# Patient Record
Sex: FEMALE | Race: WHITE | Employment: FULL TIME | URBAN - METROPOLITAN AREA
[De-identification: names, ages, dates, MRNs, and addresses within clinical notes are randomized per-mention and may not be internally consistent; named-entity substitution may affect disease eponyms.]

---

## 2021-03-29 ENCOUNTER — HOSPITAL ENCOUNTER (INPATIENT)
Facility: HOSPITAL | Age: 51
LOS: 2 days | Discharge: HOME | DRG: 176 | End: 2021-04-01
Attending: EMERGENCY MEDICINE | Admitting: HOSPITALIST
Payer: COMMERCIAL

## 2021-03-29 ENCOUNTER — APPOINTMENT (OUTPATIENT)
Dept: CT IMAGING | Facility: HOSPITAL | Age: 51
DRG: 176 | End: 2021-03-29
Attending: EMERGENCY MEDICINE
Payer: COMMERCIAL

## 2021-03-29 DIAGNOSIS — I26.99 ACUTE PULMONARY EMBOLISM, UNSPECIFIED PULMONARY EMBOLISM TYPE, UNSPECIFIED WHETHER ACUTE COR PULMONALE PRESENT (HCC): Primary | ICD-10-CM

## 2021-03-29 PROCEDURE — 71275 CT ANGIOGRAPHY CHEST: CPT | Performed by: EMERGENCY MEDICINE

## 2021-03-29 PROCEDURE — 99223 1ST HOSP IP/OBS HIGH 75: CPT | Performed by: INTERNAL MEDICINE

## 2021-03-29 RX ORDER — HEPARIN SODIUM AND DEXTROSE 10000; 5 [USP'U]/100ML; G/100ML
18 INJECTION INTRAVENOUS ONCE
Status: COMPLETED | OUTPATIENT
Start: 2021-03-29 | End: 2021-03-30

## 2021-03-29 RX ORDER — HEPARIN SODIUM 5000 [USP'U]/ML
80 INJECTION INTRAVENOUS; SUBCUTANEOUS ONCE
Status: COMPLETED | OUTPATIENT
Start: 2021-03-29 | End: 2021-03-29

## 2021-03-30 ENCOUNTER — APPOINTMENT (OUTPATIENT)
Dept: ULTRASOUND IMAGING | Facility: HOSPITAL | Age: 51
DRG: 176 | End: 2021-03-30
Attending: INTERNAL MEDICINE
Payer: COMMERCIAL

## 2021-03-30 ENCOUNTER — APPOINTMENT (OUTPATIENT)
Dept: CV DIAGNOSTICS | Facility: HOSPITAL | Age: 51
DRG: 176 | End: 2021-03-30
Attending: INTERNAL MEDICINE
Payer: COMMERCIAL

## 2021-03-30 PROBLEM — I26.99 ACUTE PULMONARY EMBOLISM, UNSPECIFIED PULMONARY EMBOLISM TYPE, UNSPECIFIED WHETHER ACUTE COR PULMONALE PRESENT (HCC): Status: ACTIVE | Noted: 2021-03-30

## 2021-03-30 PROCEDURE — 99255 IP/OBS CONSLTJ NEW/EST HI 80: CPT | Performed by: INTERNAL MEDICINE

## 2021-03-30 PROCEDURE — 93306 TTE W/DOPPLER COMPLETE: CPT | Performed by: INTERNAL MEDICINE

## 2021-03-30 PROCEDURE — 99232 SBSQ HOSP IP/OBS MODERATE 35: CPT | Performed by: HOSPITALIST

## 2021-03-30 PROCEDURE — 93970 EXTREMITY STUDY: CPT | Performed by: INTERNAL MEDICINE

## 2021-03-30 RX ORDER — ALPRAZOLAM 0.25 MG/1
0.25 TABLET ORAL 2 TIMES DAILY PRN
Status: DISCONTINUED | OUTPATIENT
Start: 2021-03-30 | End: 2021-04-01

## 2021-03-30 RX ORDER — LABETALOL HYDROCHLORIDE 5 MG/ML
10 INJECTION, SOLUTION INTRAVENOUS EVERY 6 HOURS PRN
Status: DISCONTINUED | OUTPATIENT
Start: 2021-03-30 | End: 2021-04-01

## 2021-03-30 RX ORDER — POLYETHYLENE GLYCOL 3350 17 G/17G
17 POWDER, FOR SOLUTION ORAL DAILY PRN
Status: DISCONTINUED | OUTPATIENT
Start: 2021-03-30 | End: 2021-04-01

## 2021-03-30 RX ORDER — HYDROCODONE BITARTRATE AND ACETAMINOPHEN 10; 325 MG/1; MG/1
1 TABLET ORAL EVERY 4 HOURS PRN
Status: DISCONTINUED | OUTPATIENT
Start: 2021-03-30 | End: 2021-04-01

## 2021-03-30 RX ORDER — OXYBUTYNIN CHLORIDE 5 MG/1
5 TABLET ORAL 2 TIMES DAILY
Status: DISCONTINUED | OUTPATIENT
Start: 2021-03-30 | End: 2021-04-01

## 2021-03-30 RX ORDER — CETIRIZINE HYDROCHLORIDE 10 MG/1
10 TABLET ORAL DAILY
Status: DISCONTINUED | OUTPATIENT
Start: 2021-03-30 | End: 2021-04-01

## 2021-03-30 RX ORDER — HYDRALAZINE HYDROCHLORIDE 20 MG/ML
10 INJECTION INTRAMUSCULAR; INTRAVENOUS EVERY 6 HOURS PRN
Status: DISCONTINUED | OUTPATIENT
Start: 2021-03-30 | End: 2021-04-01

## 2021-03-30 RX ORDER — ESCITALOPRAM OXALATE 20 MG/1
20 TABLET ORAL DAILY
Status: DISCONTINUED | OUTPATIENT
Start: 2021-03-30 | End: 2021-04-01

## 2021-03-30 RX ORDER — ONDANSETRON 2 MG/ML
4 INJECTION INTRAMUSCULAR; INTRAVENOUS EVERY 6 HOURS PRN
Status: DISCONTINUED | OUTPATIENT
Start: 2021-03-30 | End: 2021-04-01

## 2021-03-30 RX ORDER — ACETAMINOPHEN 325 MG/1
650 TABLET ORAL EVERY 6 HOURS PRN
Status: DISCONTINUED | OUTPATIENT
Start: 2021-03-30 | End: 2021-04-01

## 2021-03-30 RX ORDER — HEPARIN SODIUM AND DEXTROSE 10000; 5 [USP'U]/100ML; G/100ML
INJECTION INTRAVENOUS CONTINUOUS
Status: DISCONTINUED | OUTPATIENT
Start: 2021-03-30 | End: 2021-04-01

## 2021-03-30 RX ORDER — BUPROPION HYDROCHLORIDE 100 MG/1
200 TABLET ORAL 2 TIMES DAILY
Status: DISCONTINUED | OUTPATIENT
Start: 2021-03-30 | End: 2021-04-01

## 2021-03-30 RX ORDER — TOPIRAMATE 25 MG/1
100 TABLET ORAL DAILY
Status: DISCONTINUED | OUTPATIENT
Start: 2021-03-30 | End: 2021-04-01

## 2021-03-30 RX ORDER — DOCUSATE SODIUM 100 MG/1
100 CAPSULE, LIQUID FILLED ORAL 2 TIMES DAILY PRN
Status: DISCONTINUED | OUTPATIENT
Start: 2021-03-30 | End: 2021-04-01

## 2021-03-30 RX ORDER — POTASSIUM CHLORIDE 20 MEQ/1
40 TABLET, EXTENDED RELEASE ORAL EVERY 4 HOURS
Status: COMPLETED | OUTPATIENT
Start: 2021-03-30 | End: 2021-03-30

## 2021-03-30 RX ORDER — MELATONIN
3 NIGHTLY PRN
Status: DISCONTINUED | OUTPATIENT
Start: 2021-03-30 | End: 2021-04-01

## 2021-03-30 NOTE — ED PROVIDER NOTES
Patient Seen in: [facility] Emergency Department      History   Patient presents with:  Difficulty Breathing    Stated Complaint: sob with walking     HPI/Subjective:   HPI    Nelson Garcia is a pleasant 78-year-old female, with complaints of shortness of br the following components:       Result Value    BUN 23 (*)     Creatinine 1.08 (*)     BUN/CREA Ratio 21.3 (*)     GFR, Non- 59 (*)     ALT 57 (*)     All other components within normal limits   D-DIMER - Abnormal; Notable for the following diagnosis)    Disposition:  Admit  3/29/2021 10:52 pm    Follow-up:  No follow-up provider specified.         Medications Prescribed:  Current Discharge Medication List                          Hospital Problems     Present on Admission  Date Reviewed: 2/10

## 2021-03-30 NOTE — ED NOTES
Spoke w/ rn jan, room is being cleaned right now, wf room to be xenex cleaned , housekeeping notified

## 2021-03-30 NOTE — ED INITIAL ASSESSMENT (HPI)
Pt reports increasing RAYMUNDO since Wednesday. Thursday received first covid vaccine. RAYMUNDO continues to worsen. Denies leg swelling or fevers.

## 2021-03-30 NOTE — ED NOTES
Pt anxious about dogs at home and how long it is taking for the room to be cleaned, pt reassured that pt will be transferred to room when cleaned, pt denies cp, will continue to monitor

## 2021-03-30 NOTE — PLAN OF CARE
Patient arrived to unit approximately 0100. Admission navigator and home medications verified with patient. Norco administered for bilateral leg pain. US of lower extremity completed. Oxygen saturation remains above 92% on room air.  Short of breath with ac

## 2021-03-30 NOTE — PAYOR COMM NOTE
--------------  ADMISSION REVIEW     Payor: [address]  Subscriber #:  [id number]  Authorization Number: [id number]    Admit date: 3/30/21  Admit time: 12:53 AM       Admitting Physician: Andres Guillory MD  Attending Physician:  Omer Bunch 22   Temp 98.6 °F (37 °C)   Temp src Temporal   SpO2 93 %   O2 Device None (Room air)       Current:BP (!) 159/101   Pulse 69   Temp 98.6 °F (37 °C) (Temporal)   Resp 23   Ht 165.1 cm (5' 5\")   Wt 136.1 kg   SpO2 94%   BMI 49.92 kg/m²         Physical Exa due to her multiple pulmonary emboli. This involved direct patient intervention, complex decision making, and/or extensive discussions with the patient, family, and clinical staff.           MDM      Patient was ordered heparin per protocol was discussed w She denies any fever, chills, cough or congestion. She has no associated nausea, vomiting, diaphoresis, abdominal pain, diarrhea or dysuria. She has mild right calf tenderness and some edema in lower extremities bilaterally.   In the ER, HR normal and SBP tablet, Rfl: 0  Diclofenac Sodium (VOLTAREN) 1 % Transdermal Gel, Apply 4 g topically 4 (four) times daily. , Disp: 100 g, Rfl: 2  Norethindrone 0.35 MG Oral Tab, Take 0.35 mg by mouth once daily. , Disp: , Rfl:   Citalopram Hydrobromide 40 MG Oral Tab, Take Lab 03/29/21 2059   GLU 90   BUN 23*   CREATSERUM 1.08*   GFRAA 68   GFRNAA 59*   CA 9.8   ALB 3.9      K 4.5      CO2 23.0   ALKPHO 99   AST 30   ALT 57*   BILT 1.3   TP 7.5     Estimated Creatinine Clearance: 54.8 mL/min (A) (based on SCr Intravenous Leona Hoskins RN      HYDROcodone-acetaminophen [facility])  MG per tab 1 tablet     Date Action Dose Route User    3/30/2021 0228 Given 1 tablet Oral Leona Hoskins RN      iohexol (OMNIPAQUE) 350 MG/ML injection 100 mL     Date Action Dose Route

## 2021-03-30 NOTE — CONSULTS
[facility] Cardiology Consult Note    Philippe Leikeila Patient Status:  Inpatient    1970 MRN [id number]   [facility] 7NE-A [facility] Favian Wheeler MD     HPI:  53-year-old female with a past m Cardiology    Addendum:    On review the echocardiogram the patient appears to have some evidence of RV dilation with RV to LV ratio greater than 1.0. In light of this it could be reasonable to consider EKOS catheter placement.   I discussed this with the murmur, pericardial rub, S3.  Lungs: Clear without wheezes, rales, rhonchi or dullness. Normal excursions and effort. Abdomen: Soft, non-tender. BS-present. Extremities: Without clubbing, cyanosis or edema. Peripheral pulses are 2+.   Neurologic: Non-fo

## 2021-03-30 NOTE — PLAN OF CARE
Received patient A/Ox4, RA, NSR on tele at 0700  2L NC placed to keep SpO2 >90%  Echo completed  Heparin gtt per PE order set, PTT in am  Norco for pain to knees  Ambulated to bathroom, SOB with exertion   Potassium replaced per electrolyte protocol    Pro

## 2021-03-30 NOTE — PROGRESS NOTES
[facility]  Progress Note     Mounika Henderson Patient Status:  Inpatient    1970 MRN [id number]   [facility] 7NE-A Attending Jeri [facility] Day # 0 PCP Hernandez Juarez MD     Chief Complaint: SOB    S: Patient in the last 168 hours. COVID-19 Lab Results    COVID-19  Lab Results   Component Value Date    COVID19 Not Detected 03/29/2021       Pro-Calcitonin  No results for input(s): PCT in the last 168 hours.     Cardiac  Recent Labs   Lab 03/29/21 2059 discussed with pt at bedside    Rodolfo Gibes, DO

## 2021-03-30 NOTE — H&P
[facility]  History and Physical     Thao Bloomingrose Patient Status:  Emergency    1970 MRN [id number]   Location [facility] Attending Daniela August MD   Hosp Day # 0 PCP Karthik Luna MD     Chief Complaint: congestion    Medications:  No current facility-administered medications on file prior to encounter.   HYDROcodone-acetaminophen (NORCO)  MG Oral Tab, 1 tab po q4 hr prn severe knee pain-30 days, Disp: 90 tablet, Rfl: 0  Diclofenac Sodium 50 MG Oral T No JVD. No carotid bruits. Respiratory: Clear to auscultation bilaterally. No wheezes. No rhonchi. Cardiovascular: S1, S2. Regular rate and rhythm. No murmurs, rubs or gallops. Equal pulses. Chest and Back: No tenderness or deformity.   Abdomen: Soft, n Prophylaxis: Heparin gtt  · CODE status: Full  · [place]: No  · If COVID testing is negative, may discontinue isolation: Yes     Plan of care discussed with patient.     Isra Quintana DO  3/29/2021

## 2021-03-31 PROCEDURE — 99232 SBSQ HOSP IP/OBS MODERATE 35: CPT | Performed by: INTERNAL MEDICINE

## 2021-03-31 PROCEDURE — 99232 SBSQ HOSP IP/OBS MODERATE 35: CPT | Performed by: HOSPITALIST

## 2021-03-31 NOTE — PAYOR COMM NOTE
--------------  CONTINUED STAY REVIEW    Payor: [facility]  Subscriber #:  [id number]  Authorization Number: [id number]    Admit date: 3/30/21  Admit time: 12:53 AM    Admitting Physician: Jt Hartley MD  Attending Physician:  Barb Valdes primary service with regards to plans for chronic anticoagulation. May need to consider hematology evaluation.   Cardiology will follow peripherally, please call with any questions or concerns.     Terry Oliver DO         Electronically signed by Chaparrita Johns 3/31/2021 0940 Given 100 mg Oral Johann Null, JUAN        ASSESSMENT / PLAN:      1. Acute bilateral PE with LLE DVT and hypoxia. On OCP since the age of 15yo for heavy menses. No children  1. Cardiology consulted  2. Heparin drip  3.  Stopped OCP an

## 2021-03-31 NOTE — PLAN OF CARE
Assumed pt care at 0730  VSS, patient weened to 0.5L NC, still experiencing some SOB w/exertion  Heparin gtt infusing per protocol, next pTT in AM  PRN Norco given for knee pain  POC discussed with pt  Needs attended to      Problem: Patient/Family Goals

## 2021-03-31 NOTE — PLAN OF CARE
Assumed care 1930  Pt NSR on tele, 2L O2 NC  Heparin infusing  Needs met will continue to monitor          Problem: RESPIRATORY - ADULT  Goal: Achieves optimal ventilation and oxygenation  Description: INTERVENTIONS:  - Assess for changes in respiratory st

## 2021-03-31 NOTE — PROGRESS NOTES
[facility]  Cardiology Progress Note    Subjective:  No chest pain or shortness of breath.     Objective:  /90 (BP Location: Right arm)   Pulse 76   Temp 98 °F (36.7 °C) (Oral)   Resp 16   Ht 5' 5\" (1.651 m)   Wt 300 lb (136.1 kg)   SpO2 95%   B

## 2021-03-31 NOTE — PROGRESS NOTES
[facility]  Progress Note     Med Perez Patient Status:  Inpatient    1970 MRN [id number]   [facility] 7NE-A Attending Kendal [facility] Day # 1 PCP Eneida Wells MD     Chief Complaint: SOB    S: No overn Pro-Calcitonin  No results for input(s): PCT in the last 168 hours. Cardiac  Recent Labs   Lab 03/29/21 2059   TROP <0.045   PBNP 2,159*       Creatinine Kinase  No results for input(s): CK in the last 168 hours.     Inflammatory Markers  Recent L

## 2021-04-01 VITALS
BODY MASS INDEX: 48.82 KG/M2 | HEART RATE: 75 BPM | RESPIRATION RATE: 16 BRPM | DIASTOLIC BLOOD PRESSURE: 82 MMHG | WEIGHT: 293 LBS | SYSTOLIC BLOOD PRESSURE: 133 MMHG | HEIGHT: 65 IN | OXYGEN SATURATION: 96 % | TEMPERATURE: 98 F

## 2021-04-01 PROCEDURE — 99239 HOSP IP/OBS DSCHRG MGMT >30: CPT | Performed by: HOSPITALIST

## 2021-04-01 NOTE — PROGRESS NOTES
[facility]  Progress Note     Slava Akbar Patient Status:  Inpatient    1970 MRN [id number]   [facility] 7NE-A Attending Damian Gu[address] Day # 2 PCP Todd Cherry MD     Chief Complaint: SOB    S: No overn 03/29/2021       Pro-Calcitonin  No results for input(s): PCT in the last 168 hours. Cardiac  Recent Labs   Lab 03/29/21 2059   TROP <0.045   PBNP 2,159*       Creatinine Kinase  No results for input(s): CK in the last 168 hours.     Inflammatory Marker

## 2021-04-01 NOTE — PLAN OF CARE
Assumed care of pt at approximately 1900. Alert, oriented x4. On .5L O2 per NC, increased to 2L with sleep to maintain O2 at >90%. Denies any chest pain. Reports dyspnea with ambulation. Heparin gtt infusing per protocol.    Norco given for chronic tobin injury  Description: (Example usage: patient with low platelets)  INTERVENTIONS:  - Avoid intramuscular injections, enemas and rectal medication administration  - Ensure safe mobilization of patient  - Hold pressure on venipuncture sites to achieve adequat

## 2021-04-01 NOTE — PLAN OF CARE
NURSING DISCHARGE NOTE    Discharged Home via Wheelchair. Accompanied by Support staff  Belongings Taken by patient/family. Patient educated on dc medications, follow ups, and instructions. She verbalized understanding. All questions answered.

## 2021-04-02 NOTE — DISCHARGE SUMMARY
[facility]  DISCHARGE SUMMARY     Rachael Escobar Patient Status:  Inpatient    1970 MRN [id number]   [facility] 7NE-A Attending No att. providers found   Hosp Day # 2 PCP Juana Kapadia MD     Date of Admission: 3/29/2021  Margarito pain, diarrhea or dysuria. She has mild right calf tenderness and some edema in lower extremities bilaterally. In the ER, HR normal and SBP elevated around 160s. She was on room air. D-dimer was elevated. Troponin negative.   CTA chest showed bilateral apixaban 5 MG Tabs  Commonly known as: ELIQUIS      Take 2 tabs (10mg) by mouth twice daily for 7 days, then take 1 tab (5mg) by mouth twice daily thereafter.    Quantity: 74 tablet  Refills: 0        CONTINUE taking these medications      Instructions [address] [facility], [phone], [phone]  [facility] [address] [facility], [address]    Phone: [phone]   · apixaban 5 MG Tabs         Follow-up appointment:   No follow-up provider specified.

## 2021-09-21 ENCOUNTER — HOSPITAL ENCOUNTER (OUTPATIENT)
Dept: ULTRASOUND IMAGING | Age: 51
Discharge: HOME | End: 2021-09-21
Attending: INTERNAL MEDICINE
Payer: COMMERCIAL

## 2021-09-21 DIAGNOSIS — Z86.718 PERSONAL HISTORY OF DVT (DEEP VEIN THROMBOSIS): ICD-10-CM

## 2021-09-21 PROCEDURE — 93970 EXTREMITY STUDY: CPT | Performed by: INTERNAL MEDICINE

## 2022-12-13 ENCOUNTER — APPOINTMENT (OUTPATIENT)
Dept: GENERAL RADIOLOGY | Facility: HOSPITAL | Age: 52
End: 2022-12-13
Attending: EMERGENCY MEDICINE
Payer: COMMERCIAL

## 2022-12-13 ENCOUNTER — HOSPITAL ENCOUNTER (INPATIENT)
Facility: HOSPITAL | Age: 52
LOS: 2 days | Discharge: HOME | End: 2022-12-16
Attending: EMERGENCY MEDICINE | Admitting: HOSPITALIST
Payer: COMMERCIAL

## 2022-12-13 ENCOUNTER — APPOINTMENT (OUTPATIENT)
Dept: CT IMAGING | Facility: HOSPITAL | Age: 52
End: 2022-12-13
Attending: EMERGENCY MEDICINE
Payer: COMMERCIAL

## 2022-12-13 ENCOUNTER — HOSPITAL ENCOUNTER (INPATIENT)
Facility: HOSPITAL | Age: 52
LOS: 2 days | Discharge: HOME | DRG: 177 | End: 2022-12-16
Attending: EMERGENCY MEDICINE | Admitting: HOSPITALIST
Payer: COMMERCIAL

## 2022-12-13 ENCOUNTER — APPOINTMENT (OUTPATIENT)
Dept: GENERAL RADIOLOGY | Facility: HOSPITAL | Age: 52
DRG: 177 | End: 2022-12-13
Attending: EMERGENCY MEDICINE
Payer: COMMERCIAL

## 2022-12-13 ENCOUNTER — APPOINTMENT (OUTPATIENT)
Dept: CT IMAGING | Facility: HOSPITAL | Age: 52
DRG: 177 | End: 2022-12-13
Attending: EMERGENCY MEDICINE
Payer: COMMERCIAL

## 2022-12-13 DIAGNOSIS — U07.1 PNEUMONIA DUE TO COVID-19 VIRUS: Primary | ICD-10-CM

## 2022-12-13 DIAGNOSIS — R09.02 HYPOXIA: ICD-10-CM

## 2022-12-13 DIAGNOSIS — J12.82 PNEUMONIA DUE TO COVID-19 VIRUS: Primary | ICD-10-CM

## 2022-12-13 DIAGNOSIS — I50.9 ACUTE ON CHRONIC CONGESTIVE HEART FAILURE, UNSPECIFIED HEART FAILURE TYPE (HCC): ICD-10-CM

## 2022-12-13 LAB
ALBUMIN SERPL-MCNC: 3.2 G/DL (ref 3.4–5)
ALBUMIN/GLOB SERPL: 0.8 {RATIO} (ref 1–2)
ALP LIVER SERPL-CCNC: 83 U/L
ALT SERPL-CCNC: 19 U/L
ANION GAP SERPL CALC-SCNC: 9 MMOL/L (ref 0–18)
AST SERPL-CCNC: 25 U/L (ref 15–37)
BASOPHILS # BLD AUTO: 0.04 X10(3) UL (ref 0–0.2)
BASOPHILS NFR BLD AUTO: 0.3 %
BILIRUB SERPL-MCNC: 1.2 MG/DL (ref 0.1–2)
BUN BLD-MCNC: 29 MG/DL (ref 7–18)
CALCIUM BLD-MCNC: 9.1 MG/DL (ref 8.5–10.1)
CHLORIDE SERPL-SCNC: 109 MMOL/L (ref 98–112)
CO2 SERPL-SCNC: 22 MMOL/L (ref 21–32)
CREAT BLD-MCNC: 1.41 MG/DL
D DIMER PPP FEU-MCNC: 1 UG/ML FEU (ref ?–0.54)
EOSINOPHIL # BLD AUTO: 0.03 X10(3) UL (ref 0–0.7)
EOSINOPHIL NFR BLD AUTO: 0.2 %
ERYTHROCYTE [DISTWIDTH] IN BLOOD BY AUTOMATED COUNT: 14.6 %
FLUAV + FLUBV RNA SPEC NAA+PROBE: NEGATIVE
FLUAV + FLUBV RNA SPEC NAA+PROBE: NEGATIVE
GFR SERPLBLD BASED ON 1.73 SQ M-ARVRAT: 44 ML/MIN/1.73M2 (ref 60–?)
GLOBULIN PLAS-MCNC: 4.2 G/DL (ref 2.8–4.4)
GLUCOSE BLD-MCNC: 102 MG/DL (ref 70–99)
HCT VFR BLD AUTO: 35.4 %
HGB BLD-MCNC: 11.4 G/DL
IMM GRANULOCYTES # BLD AUTO: 0.04 X10(3) UL (ref 0–1)
IMM GRANULOCYTES NFR BLD: 0.3 %
LYMPHOCYTES # BLD AUTO: 1.89 X10(3) UL (ref 1–4)
LYMPHOCYTES NFR BLD AUTO: 12.3 %
MCH RBC QN AUTO: 28.4 PG (ref 26–34)
MCHC RBC AUTO-ENTMCNC: 32.2 G/DL (ref 31–37)
MCV RBC AUTO: 88.1 FL
MONOCYTES # BLD AUTO: 0.56 X10(3) UL (ref 0.1–1)
MONOCYTES NFR BLD AUTO: 3.6 %
NEUTROPHILS # BLD AUTO: 12.82 X10 (3) UL (ref 1.5–7.7)
NEUTROPHILS # BLD AUTO: 12.82 X10(3) UL (ref 1.5–7.7)
NEUTROPHILS NFR BLD AUTO: 83.3 %
NT-PROBNP SERPL-MCNC: 3155 PG/ML (ref ?–125)
OSMOLALITY SERPL CALC.SUM OF ELEC: 296 MOSM/KG (ref 275–295)
PLATELET # BLD AUTO: 249 10(3)UL (ref 150–450)
POTASSIUM SERPL-SCNC: 3.5 MMOL/L (ref 3.5–5.1)
PROT SERPL-MCNC: 7.4 G/DL (ref 6.4–8.2)
RBC # BLD AUTO: 4.02 X10(6)UL
RSV RNA SPEC NAA+PROBE: NEGATIVE
SARS-COV-2 RNA RESP QL NAA+PROBE: DETECTED
SODIUM SERPL-SCNC: 140 MMOL/L (ref 136–145)
TROPONIN I HIGH SENSITIVITY: 11 NG/L
WBC # BLD AUTO: 15.4 X10(3) UL (ref 4–11)

## 2022-12-13 PROCEDURE — 71045 X-RAY EXAM CHEST 1 VIEW: CPT | Performed by: EMERGENCY MEDICINE

## 2022-12-13 PROCEDURE — 71260 CT THORAX DX C+: CPT | Performed by: EMERGENCY MEDICINE

## 2022-12-13 NOTE — ED INITIAL ASSESSMENT (HPI)
Patient to ER from w/ complaints of SOB on exertion. States yesterday she felt like she was getting a cold but today experienced the RAYMUNDO. Hx of PE 2 years ago. 99%spo2 on RA    Denies CP  Easy WOB noted.

## 2022-12-14 PROBLEM — R09.02 HYPOXIA: Status: ACTIVE | Noted: 2022-12-14

## 2022-12-14 PROBLEM — U07.1 PNEUMONIA DUE TO COVID-19 VIRUS: Status: ACTIVE | Noted: 2022-12-14

## 2022-12-14 PROBLEM — I50.9 ACUTE ON CHRONIC CONGESTIVE HEART FAILURE, UNSPECIFIED HEART FAILURE TYPE (HCC): Status: ACTIVE | Noted: 2022-12-14

## 2022-12-14 PROBLEM — J12.82 PNEUMONIA DUE TO COVID-19 VIRUS: Status: ACTIVE | Noted: 2022-12-14

## 2022-12-14 LAB
ALBUMIN SERPL-MCNC: 3.3 G/DL (ref 3.4–5)
ALBUMIN/GLOB SERPL: 0.9 {RATIO} (ref 1–2)
ALP LIVER SERPL-CCNC: 82 U/L
ALT SERPL-CCNC: 18 U/L
ANION GAP SERPL CALC-SCNC: 6 MMOL/L (ref 0–18)
AST SERPL-CCNC: 15 U/L (ref 15–37)
ATRIAL RATE: 67 BPM
ATRIAL RATE: 70 BPM
BASOPHILS # BLD AUTO: 0.03 X10(3) UL (ref 0–0.2)
BASOPHILS NFR BLD AUTO: 0.2 %
BILIRUB SERPL-MCNC: 1.4 MG/DL (ref 0.1–2)
BILIRUB UR QL STRIP.AUTO: NEGATIVE
BUN BLD-MCNC: 29 MG/DL (ref 7–18)
CALCIUM BLD-MCNC: 8.9 MG/DL (ref 8.5–10.1)
CHLORIDE SERPL-SCNC: 109 MMOL/L (ref 98–112)
CK SERPL-CCNC: 108 U/L
CLARITY UR REFRACT.AUTO: CLEAR
CO2 SERPL-SCNC: 24 MMOL/L (ref 21–32)
COLOR UR AUTO: YELLOW
CREAT BLD-MCNC: 1.34 MG/DL
CRP SERPL-MCNC: 13.9 MG/DL (ref ?–0.3)
D DIMER PPP FEU-MCNC: 1.23 UG/ML FEU (ref ?–0.54)
DEPRECATED HBV CORE AB SER IA-ACNC: 185.2 NG/ML
EOSINOPHIL # BLD AUTO: 0.04 X10(3) UL (ref 0–0.7)
EOSINOPHIL NFR BLD AUTO: 0.3 %
ERYTHROCYTE [DISTWIDTH] IN BLOOD BY AUTOMATED COUNT: 14.8 %
GFR SERPLBLD BASED ON 1.73 SQ M-ARVRAT: 47 ML/MIN/1.73M2 (ref 60–?)
GLOBULIN PLAS-MCNC: 3.8 G/DL (ref 2.8–4.4)
GLUCOSE BLD-MCNC: 97 MG/DL (ref 70–99)
GLUCOSE UR STRIP.AUTO-MCNC: NEGATIVE MG/DL
HCT VFR BLD AUTO: 34.7 %
HGB BLD-MCNC: 11 G/DL
IMM GRANULOCYTES # BLD AUTO: 0.05 X10(3) UL (ref 0–1)
IMM GRANULOCYTES NFR BLD: 0.4 %
KETONES UR STRIP.AUTO-MCNC: NEGATIVE MG/DL
L PNEUMO AG UR QL: NEGATIVE
LDH SERPL L TO P-CCNC: 227 U/L
LEUKOCYTE ESTERASE UR QL STRIP.AUTO: NEGATIVE
LYMPHOCYTES # BLD AUTO: 1.81 X10(3) UL (ref 1–4)
LYMPHOCYTES NFR BLD AUTO: 14.7 %
MCH RBC QN AUTO: 28.2 PG (ref 26–34)
MCHC RBC AUTO-ENTMCNC: 31.7 G/DL (ref 31–37)
MCV RBC AUTO: 89 FL
MONOCYTES # BLD AUTO: 0.42 X10(3) UL (ref 0.1–1)
MONOCYTES NFR BLD AUTO: 3.4 %
NEUTROPHILS # BLD AUTO: 10 X10 (3) UL (ref 1.5–7.7)
NEUTROPHILS # BLD AUTO: 10 X10(3) UL (ref 1.5–7.7)
NEUTROPHILS NFR BLD AUTO: 81 %
NITRITE UR QL STRIP.AUTO: NEGATIVE
OSMOLALITY SERPL CALC.SUM OF ELEC: 294 MOSM/KG (ref 275–295)
P AXIS: 16 DEGREES
P AXIS: 41 DEGREES
P-R INTERVAL: 156 MS
P-R INTERVAL: 160 MS
PH UR STRIP.AUTO: 6.5 [PH] (ref 5–8)
PLATELET # BLD AUTO: 230 10(3)UL (ref 150–450)
POTASSIUM SERPL-SCNC: 3.4 MMOL/L (ref 3.5–5.1)
PROCALCITONIN SERPL-MCNC: 14.13 NG/ML (ref ?–0.16)
PROT SERPL-MCNC: 7.1 G/DL (ref 6.4–8.2)
PROT UR STRIP.AUTO-MCNC: NEGATIVE MG/DL
Q-T INTERVAL: 428 MS
Q-T INTERVAL: 438 MS
QRS DURATION: 94 MS
QRS DURATION: 96 MS
QTC CALCULATION (BEZET): 452 MS
QTC CALCULATION (BEZET): 473 MS
R AXIS: 52 DEGREES
R AXIS: 62 DEGREES
RBC # BLD AUTO: 3.9 X10(6)UL
RBC UR QL AUTO: NEGATIVE
SODIUM SERPL-SCNC: 139 MMOL/L (ref 136–145)
SP GR UR STRIP.AUTO: 1.01 (ref 1–1.03)
STREP PNEUMO ANTIGEN, URINE: NEGATIVE
T AXIS: 46 DEGREES
T AXIS: 53 DEGREES
TROPONIN I HIGH SENSITIVITY: 7 NG/L
UROBILINOGEN UR STRIP.AUTO-MCNC: 0.2 MG/DL
VENTRICULAR RATE: 67 BPM
VENTRICULAR RATE: 70 BPM
WBC # BLD AUTO: 12.4 X10(3) UL (ref 4–11)

## 2022-12-14 PROCEDURE — 3E0333Z INTRODUCTION OF ANTI-INFLAMMATORY INTO PERIPHERAL VEIN, PERCUTANEOUS APPROACH: ICD-10-PCS | Performed by: HOSPITALIST

## 2022-12-14 PROCEDURE — XW033E5 INTRODUCTION OF REMDESIVIR ANTI-INFECTIVE INTO PERIPHERAL VEIN, PERCUTANEOUS APPROACH, NEW TECHNOLOGY GROUP 5: ICD-10-PCS | Performed by: HOSPITALIST

## 2022-12-14 PROCEDURE — 99223 1ST HOSP IP/OBS HIGH 75: CPT | Performed by: STUDENT IN AN ORGANIZED HEALTH CARE EDUCATION/TRAINING PROGRAM

## 2022-12-14 RX ORDER — ESCITALOPRAM OXALATE 20 MG/1
20 TABLET ORAL DAILY
Status: DISCONTINUED | OUTPATIENT
Start: 2022-12-14 | End: 2022-12-16

## 2022-12-14 RX ORDER — BENZONATATE 200 MG/1
200 CAPSULE ORAL 3 TIMES DAILY PRN
Status: DISCONTINUED | OUTPATIENT
Start: 2022-12-14 | End: 2022-12-16

## 2022-12-14 RX ORDER — FUROSEMIDE 10 MG/ML
40 INJECTION INTRAMUSCULAR; INTRAVENOUS ONCE
Status: COMPLETED | OUTPATIENT
Start: 2022-12-14 | End: 2022-12-14

## 2022-12-14 RX ORDER — HYDROCODONE BITARTRATE AND ACETAMINOPHEN 10; 325 MG/1; MG/1
1 TABLET ORAL 3 TIMES DAILY
Status: DISCONTINUED | OUTPATIENT
Start: 2022-12-14 | End: 2022-12-16

## 2022-12-14 RX ORDER — ALBUTEROL SULFATE 90 UG/1
4 AEROSOL, METERED RESPIRATORY (INHALATION) EVERY 4 HOURS PRN
Status: DISCONTINUED | OUTPATIENT
Start: 2022-12-14 | End: 2022-12-16

## 2022-12-14 RX ORDER — MELATONIN
3 NIGHTLY PRN
Status: DISCONTINUED | OUTPATIENT
Start: 2022-12-14 | End: 2022-12-16

## 2022-12-14 RX ORDER — DEXAMETHASONE SODIUM PHOSPHATE 4 MG/ML
6 VIAL (ML) INJECTION DAILY
Status: DISCONTINUED | OUTPATIENT
Start: 2022-12-14 | End: 2022-12-15

## 2022-12-14 RX ORDER — BENZONATATE 100 MG/1
100 CAPSULE ORAL 3 TIMES DAILY
Status: DISCONTINUED | OUTPATIENT
Start: 2022-12-14 | End: 2022-12-16

## 2022-12-14 RX ORDER — ALPRAZOLAM 0.25 MG/1
0.25 TABLET ORAL DAILY PRN
Status: DISCONTINUED | OUTPATIENT
Start: 2022-12-14 | End: 2022-12-16

## 2022-12-14 RX ORDER — BISACODYL 10 MG
10 SUPPOSITORY, RECTAL RECTAL
Status: DISCONTINUED | OUTPATIENT
Start: 2022-12-14 | End: 2022-12-16

## 2022-12-14 RX ORDER — ONDANSETRON 2 MG/ML
4 INJECTION INTRAMUSCULAR; INTRAVENOUS EVERY 6 HOURS PRN
Status: DISCONTINUED | OUTPATIENT
Start: 2022-12-14 | End: 2022-12-16

## 2022-12-14 RX ORDER — SENNOSIDES 8.6 MG
17.2 TABLET ORAL NIGHTLY PRN
Status: DISCONTINUED | OUTPATIENT
Start: 2022-12-14 | End: 2022-12-16

## 2022-12-14 RX ORDER — BUPROPION HYDROCHLORIDE 100 MG/1
200 TABLET ORAL 2 TIMES DAILY
Status: DISCONTINUED | OUTPATIENT
Start: 2022-12-14 | End: 2022-12-16

## 2022-12-14 RX ORDER — CETIRIZINE HYDROCHLORIDE 10 MG/1
10 TABLET ORAL DAILY
Status: DISCONTINUED | OUTPATIENT
Start: 2022-12-14 | End: 2022-12-16

## 2022-12-14 RX ORDER — POLYETHYLENE GLYCOL 3350 17 G/17G
17 POWDER, FOR SOLUTION ORAL DAILY PRN
Status: DISCONTINUED | OUTPATIENT
Start: 2022-12-14 | End: 2022-12-15

## 2022-12-14 RX ORDER — POTASSIUM CHLORIDE 20 MEQ/1
40 TABLET, EXTENDED RELEASE ORAL ONCE
Status: COMPLETED | OUTPATIENT
Start: 2022-12-14 | End: 2022-12-14

## 2022-12-14 RX ORDER — SODIUM PHOSPHATE, DIBASIC AND SODIUM PHOSPHATE, MONOBASIC 7; 19 G/133ML; G/133ML
1 ENEMA RECTAL ONCE AS NEEDED
Status: DISCONTINUED | OUTPATIENT
Start: 2022-12-14 | End: 2022-12-16

## 2022-12-14 RX ORDER — ACETAMINOPHEN 500 MG
500 TABLET ORAL EVERY 4 HOURS PRN
Status: DISCONTINUED | OUTPATIENT
Start: 2022-12-14 | End: 2022-12-16

## 2022-12-14 RX ORDER — AZITHROMYCIN 250 MG/1
500 TABLET, FILM COATED ORAL
Status: DISCONTINUED | OUTPATIENT
Start: 2022-12-14 | End: 2022-12-15

## 2022-12-14 RX ORDER — ECHINACEA PURPUREA EXTRACT 125 MG
1 TABLET ORAL
Status: DISCONTINUED | OUTPATIENT
Start: 2022-12-14 | End: 2022-12-16

## 2022-12-14 RX ORDER — CALCIUM CARBONATE 200(500)MG
500 TABLET,CHEWABLE ORAL 3 TIMES DAILY PRN
Status: DISCONTINUED | OUTPATIENT
Start: 2022-12-14 | End: 2022-12-16

## 2022-12-14 RX ORDER — OXYBUTYNIN CHLORIDE 5 MG/1
5 TABLET ORAL 2 TIMES DAILY
Status: DISCONTINUED | OUTPATIENT
Start: 2022-12-14 | End: 2022-12-16

## 2022-12-14 RX ORDER — FUROSEMIDE 10 MG/ML
20 INJECTION INTRAMUSCULAR; INTRAVENOUS ONCE
Status: COMPLETED | OUTPATIENT
Start: 2022-12-14 | End: 2022-12-14

## 2022-12-14 RX ORDER — GABAPENTIN 300 MG/1
300 CAPSULE ORAL 3 TIMES DAILY
Status: DISCONTINUED | OUTPATIENT
Start: 2022-12-14 | End: 2022-12-16

## 2022-12-14 RX ORDER — TOPIRAMATE 25 MG/1
100 TABLET ORAL DAILY
Status: DISCONTINUED | OUTPATIENT
Start: 2022-12-14 | End: 2022-12-16

## 2022-12-14 RX ORDER — GUAIFENESIN 600 MG/1
600 TABLET, EXTENDED RELEASE ORAL 2 TIMES DAILY
Status: DISCONTINUED | OUTPATIENT
Start: 2022-12-14 | End: 2022-12-16

## 2022-12-14 RX ORDER — PROCHLORPERAZINE EDISYLATE 5 MG/ML
5 INJECTION INTRAMUSCULAR; INTRAVENOUS EVERY 8 HOURS PRN
Status: DISCONTINUED | OUTPATIENT
Start: 2022-12-14 | End: 2022-12-16

## 2022-12-14 NOTE — PROGRESS NOTES
NURSING ADMISSION NOTE      Patient admitted via Cart  Oriented to room. Safety precautions initiated. Bed in low position. Call light in reach. Admitted to room 530    Pt arrived alert and oriented x4, on RA. Admission navigator, med rec, and flowsheets complete. Educated pt on POC, verbalizes understanding, no further questions. Pt updated on POC.

## 2022-12-14 NOTE — CM/SW NOTE
12/14/22 1400   CM/SW Referral Data   Referral Source Physician   Reason for Referral Protocol order set   Specify order set Pneumonia   Informant Patient;EMR;Clinical Staff Member   Patient Info   Patient's Current Mental Status at Time of Assessment Alert;Oriented   Patient's [address]   Discharge Needs   Anticipated D/C needs No anticipated discharge needs     Pt is a 45year old female admitted with pneumonia due to COVID-19. SW received  eval order due to dx of pna. SW met with pt at bedside. Pt stated she lives alone and is normally independent. SW offered [facility], pt declined. No further needs anticipated. SW will continue to remain available for any additional discharge needs.     SHAY Yang  Discharge Planner Condition:: Telangiectasia Please Describe Your Condition:: Patient presents today for 3rd VBeam treatment.  Patient did not have any concerns from last treatment.  Patient can see an improvement.

## 2022-12-14 NOTE — ED QUICK NOTES
Orders for admission, patient is aware of plan and ready to go upstairs. Any questions, please call ED RN Sherry at extension [phone]. Patient Covid vaccination status: Fully vaccinated     COVID Test Ordered in ED: SARS-CoV-2/Flu A and B/RSV by PCR (GeneXpert)    COVID Suspicion at Admission: N/A    Running Infusions:  None    Mental Status/LOC at time of transport:  Aox4    Other pertinent information:   CIWA score: N/A   NIH score:  N/A

## 2022-12-15 ENCOUNTER — APPOINTMENT (OUTPATIENT)
Dept: CV DIAGNOSTICS | Facility: HOSPITAL | Age: 52
End: 2022-12-15
Attending: HOSPITALIST
Payer: COMMERCIAL

## 2022-12-15 ENCOUNTER — APPOINTMENT (OUTPATIENT)
Dept: CV DIAGNOSTICS | Facility: HOSPITAL | Age: 52
DRG: 177 | End: 2022-12-15
Attending: HOSPITALIST
Payer: COMMERCIAL

## 2022-12-15 LAB
ALBUMIN SERPL-MCNC: 2.9 G/DL (ref 3.4–5)
ALBUMIN/GLOB SERPL: 0.8 {RATIO} (ref 1–2)
ALP LIVER SERPL-CCNC: 73 U/L
ALT SERPL-CCNC: 15 U/L
ANION GAP SERPL CALC-SCNC: 5 MMOL/L (ref 0–18)
AST SERPL-CCNC: 16 U/L (ref 15–37)
BASOPHILS # BLD AUTO: 0.02 X10(3) UL (ref 0–0.2)
BASOPHILS NFR BLD AUTO: 0.2 %
BILIRUB SERPL-MCNC: 0.7 MG/DL (ref 0.1–2)
BUN BLD-MCNC: 28 MG/DL (ref 7–18)
CALCIUM BLD-MCNC: 8.8 MG/DL (ref 8.5–10.1)
CHLORIDE SERPL-SCNC: 113 MMOL/L (ref 98–112)
CO2 SERPL-SCNC: 22 MMOL/L (ref 21–32)
CREAT BLD-MCNC: 0.93 MG/DL
CRP SERPL-MCNC: 9.83 MG/DL (ref ?–0.3)
D DIMER PPP FEU-MCNC: 0.5 UG/ML FEU (ref ?–0.54)
EOSINOPHIL # BLD AUTO: 0.02 X10(3) UL (ref 0–0.7)
EOSINOPHIL NFR BLD AUTO: 0.2 %
ERYTHROCYTE [DISTWIDTH] IN BLOOD BY AUTOMATED COUNT: 14.8 %
GFR SERPLBLD BASED ON 1.73 SQ M-ARVRAT: 73 ML/MIN/1.73M2 (ref 60–?)
GLOBULIN PLAS-MCNC: 3.6 G/DL (ref 2.8–4.4)
GLUCOSE BLD-MCNC: 136 MG/DL (ref 70–99)
HCT VFR BLD AUTO: 33 %
HGB BLD-MCNC: 10.4 G/DL
IMM GRANULOCYTES # BLD AUTO: 0.03 X10(3) UL (ref 0–1)
IMM GRANULOCYTES NFR BLD: 0.3 %
LYMPHOCYTES # BLD AUTO: 1.17 X10(3) UL (ref 1–4)
LYMPHOCYTES NFR BLD AUTO: 13.4 %
MCH RBC QN AUTO: 28.3 PG (ref 26–34)
MCHC RBC AUTO-ENTMCNC: 31.5 G/DL (ref 31–37)
MCV RBC AUTO: 89.7 FL
MONOCYTES # BLD AUTO: 0.33 X10(3) UL (ref 0.1–1)
MONOCYTES NFR BLD AUTO: 3.8 %
NEUTROPHILS # BLD AUTO: 7.16 X10 (3) UL (ref 1.5–7.7)
NEUTROPHILS # BLD AUTO: 7.16 X10(3) UL (ref 1.5–7.7)
NEUTROPHILS NFR BLD AUTO: 82.1 %
OSMOLALITY SERPL CALC.SUM OF ELEC: 298 MOSM/KG (ref 275–295)
PLATELET # BLD AUTO: 226 10(3)UL (ref 150–450)
POTASSIUM SERPL-SCNC: 4.4 MMOL/L (ref 3.5–5.1)
POTASSIUM SERPL-SCNC: 4.4 MMOL/L (ref 3.5–5.1)
PROT SERPL-MCNC: 6.5 G/DL (ref 6.4–8.2)
RBC # BLD AUTO: 3.68 X10(6)UL
SODIUM SERPL-SCNC: 140 MMOL/L (ref 136–145)
WBC # BLD AUTO: 8.7 X10(3) UL (ref 4–11)

## 2022-12-15 PROCEDURE — 99233 SBSQ HOSP IP/OBS HIGH 50: CPT | Performed by: HOSPITALIST

## 2022-12-15 PROCEDURE — 93306 TTE W/DOPPLER COMPLETE: CPT | Performed by: HOSPITALIST

## 2022-12-15 RX ORDER — POLYETHYLENE GLYCOL 3350 17 G/17G
17 POWDER, FOR SOLUTION ORAL DAILY PRN
Status: DISCONTINUED | OUTPATIENT
Start: 2022-12-15 | End: 2022-12-16

## 2022-12-15 RX ORDER — POLYETHYLENE GLYCOL 3350 17 G/17G
17 POWDER, FOR SOLUTION ORAL DAILY
Status: DISCONTINUED | OUTPATIENT
Start: 2022-12-15 | End: 2022-12-15

## 2022-12-15 RX ORDER — DOCUSATE SODIUM 100 MG/1
100 CAPSULE, LIQUID FILLED ORAL 2 TIMES DAILY
Status: DISCONTINUED | OUTPATIENT
Start: 2022-12-15 | End: 2022-12-15

## 2022-12-15 NOTE — PLAN OF CARE
Pt is A&Ox4. Pt reports bilateral knee pain, medication given per MAR. Room air, continuous pulse oxygen, O2 sating WNL. Tele-NSR. Echo done today. Pt updated on POC, verbalized understanding. Safety precautions in place, no further needs at this time.      Problem: Patient/Family Goals  Goal: Patient/Family Long Term Goal  Description: Patient's Long Term Goal: discharge home with adequate resources    Interventions:  - Administer IV abx, Ramdesevir, decadron as ordered  - See additional Care Plan goals for specific interventions  Outcome: Progressing  Goal: Patient/Family Short Term Goal  Description: Patient's Short Term Goal: improve oxygenation   12/15: encourage mobility, O2 walk, wean O2 as tolerated    Interventions:   - 1 L oxygen per NC  - See additional Care Plan goals for specific interventions  Outcome: Progressing

## 2022-12-15 NOTE — PLAN OF CARE
Patient alert and oriented x 4. Complains of pain in both knees, controlled with Norco  given as scheduled. Patient afebrile, SpO2 95% on 1 L per NC, increased O2 needs noted with exertion. Rocephin, Ramdesevir given as ordered. Patient NSR on tele, on Eliquis, decadron. Patient tolerates general diet well, denies nausea, vomiting, diarrhea.  Ambulates with standby assist.

## 2022-12-15 NOTE — PLAN OF CARE
Patient alert and oriented x4  C/o bilateral chronic knee pain d/t OA, scheduled Norco  NSR on tele monitor, peripheral pulses palpable, no edema present  Patient on 1L NC to RA, currently on RA  Educated in IS, 1000mL  Dyspnea on exertion   SZR precautions in place   Patient up in chair for breakfast  POC discussed with patient   Safety precautions in place

## 2022-12-16 VITALS
RESPIRATION RATE: 17 BRPM | WEIGHT: 280 LBS | DIASTOLIC BLOOD PRESSURE: 78 MMHG | HEART RATE: 65 BPM | TEMPERATURE: 98 F | BODY MASS INDEX: 46.65 KG/M2 | SYSTOLIC BLOOD PRESSURE: 143 MMHG | HEIGHT: 65 IN | OXYGEN SATURATION: 97 %

## 2022-12-16 LAB
ALBUMIN SERPL-MCNC: 2.8 G/DL (ref 3.4–5)
ALBUMIN/GLOB SERPL: 0.7 {RATIO} (ref 1–2)
ALP LIVER SERPL-CCNC: 67 U/L
ALT SERPL-CCNC: 21 U/L
ANION GAP SERPL CALC-SCNC: 7 MMOL/L (ref 0–18)
AST SERPL-CCNC: 22 U/L (ref 15–37)
BILIRUB SERPL-MCNC: 0.5 MG/DL (ref 0.1–2)
BUN BLD-MCNC: 26 MG/DL (ref 7–18)
CALCIUM BLD-MCNC: 9.1 MG/DL (ref 8.5–10.1)
CHLORIDE SERPL-SCNC: 113 MMOL/L (ref 98–112)
CO2 SERPL-SCNC: 20 MMOL/L (ref 21–32)
CREAT BLD-MCNC: 0.89 MG/DL
CRP SERPL-MCNC: 4.35 MG/DL (ref ?–0.3)
GFR SERPLBLD BASED ON 1.73 SQ M-ARVRAT: 77 ML/MIN/1.73M2 (ref 60–?)
GLOBULIN PLAS-MCNC: 4 G/DL (ref 2.8–4.4)
GLUCOSE BLD-MCNC: 107 MG/DL (ref 70–99)
OSMOLALITY SERPL CALC.SUM OF ELEC: 295 MOSM/KG (ref 275–295)
POTASSIUM SERPL-SCNC: 4.2 MMOL/L (ref 3.5–5.1)
PROCALCITONIN SERPL-MCNC: 3.51 NG/ML (ref ?–0.16)
PROT SERPL-MCNC: 6.8 G/DL (ref 6.4–8.2)
SODIUM SERPL-SCNC: 140 MMOL/L (ref 136–145)

## 2022-12-16 PROCEDURE — 99239 HOSP IP/OBS DSCHRG MGMT >30: CPT | Performed by: HOSPITALIST

## 2022-12-16 RX ORDER — BENZONATATE 100 MG/1
100 CAPSULE ORAL 3 TIMES DAILY PRN
Qty: 30 CAPSULE | Refills: 0 | Status: SHIPPED | OUTPATIENT
Start: 2022-12-16

## 2022-12-16 RX ORDER — CEFDINIR 300 MG/1
300 CAPSULE ORAL 2 TIMES DAILY
Qty: 10 CAPSULE | Refills: 0 | Status: SHIPPED | OUTPATIENT
Start: 2022-12-16 | End: 2022-12-21

## 2022-12-16 NOTE — PROGRESS NOTES
Patient alert, oriented X4, c/o pain in her knees around 2100 and in the morning upon awakening. Norco  administered as scheduled. Patient states, \"less pain in her chest and less coughing\". On RA overnight, SpO2 95%. NSR on telemetry, BP and HR WDL. Patient on general diet, no nausea, no vomiting, no diarrhea. Patient ambulating with standby assist, independent with daily care.

## 2022-12-16 NOTE — PROGRESS NOTES
NURSING DISCHARGE NOTE    Discharged Home via Wheelchair. Accompanied by Support staff  Belongings Taken by patient/family. Discharge education reviewed. New prescriptions and disease information provided. New medication and follow ups discussed. All questions and concerns addressed, pt verbalized understanding. PIV and tele removed Pt ready for discharge.

## 2022-12-16 NOTE — PROGRESS NOTES
12/16/22 1229   Mobility   O2 walk?  Yes   SPO2% on Room Air at Rest 99   SPO2% Ambulation on Room Air 96

## 2022-12-16 NOTE — PLAN OF CARE
Problem: Patient/Family Goals  Goal: Patient/Family Long Term Goal  Description: Patient's Long Term Goal: discharge home with adequate resources    Interventions:  - Administer IV abx, Ramdesevir, decadron as ordered  - See additional Care Plan goals for specific interventions  Outcome: Progressing  Goal: Patient/Family Short Term Goal  Description: Patient's Short Term Goal: improve oxygenation   12/15: encourage mobility, O2 walk, wean O2 as tolerated  12/16: O2 walk, encourage IS and cough/ deep breathe    Interventions:   - 1 L oxygen per NC  - See additional Care Plan goals for specific interventions  Outcome: Progressing

## 2022-12-16 NOTE — PROGRESS NOTES
[facility] [address]  [place], [address]  ?  12/16/22  ? Re: Ramy Jimenez  ? To Whom It May Concern: Ramy Jimenez was admitted to [facility] from 12/13/2022 to 12/16/22. Please excuse Ramy Jimenez from attending work for these days. The patient may return to work on Tuesday, December 20, 2022. Patient will follow up with their primary care physician upon discharge. Further restrictions and work excuse will be based on primary care physician's evaluation. ?   Thank you,    Lena Borrero MD, MD  [facility]

## 2024-06-03 RX ORDER — RIVAROXABAN 10 MG/1
TABLET, FILM COATED ORAL
Qty: 30 TABLET | OUTPATIENT
Start: 2024-06-03

## 2025-06-02 NOTE — PAYOR COMM NOTE
--------------  DISCHARGE REVIEW    Payor: [address] [facility]  Subscriber #:  [id number]  Authorization Number: [id number]    Admit date: 3/30/21  Admit time:  12:53 AM  Discharge Date: 4/1/2021  3:17 PM     Admitting Physician: Patrecia Cooks, MD  At Risk  29-58 Medium Risk  0-28   Low Risk. Risk of readmission: Letitia Fleming has Low Risk of readmission after discharge from the hospital.    History of Present Illness:  Letitia Fleming is a 44year old female with PMHx morbid obesity, anxiety, de close follow-up as outpatient with primary care physician. Also discussed consideration of hematology evaluation for further work-up if necessary. Social work was involved regarding anticoagulation and pricing per insurance.   Patient agreed with the plan days   Quantity: 90 tablet  Refills: 0     Levocetirizine Dihydrochloride 5 MG Tabs  Commonly known as: XYZAL      TAKE ONE TABLET BY MOUTH ONE TIME DAILY   Refills: 0     metoprolol Tartrate 25 MG Tabs  Commonly known as: LOPRESSOR      Take 25 mg by mout 30